# Patient Record
Sex: FEMALE | Race: WHITE | NOT HISPANIC OR LATINO | Employment: FULL TIME | URBAN - METROPOLITAN AREA
[De-identification: names, ages, dates, MRNs, and addresses within clinical notes are randomized per-mention and may not be internally consistent; named-entity substitution may affect disease eponyms.]

---

## 2024-10-14 ENCOUNTER — OFFICE VISIT (OUTPATIENT)
Dept: ENDOCRINOLOGY | Facility: CLINIC | Age: 34
End: 2024-10-14
Payer: COMMERCIAL

## 2024-10-14 VITALS
HEART RATE: 102 BPM | BODY MASS INDEX: 18.58 KG/M2 | DIASTOLIC BLOOD PRESSURE: 60 MMHG | WEIGHT: 101.63 LBS | SYSTOLIC BLOOD PRESSURE: 106 MMHG

## 2024-10-14 DIAGNOSIS — E03.9 HYPOTHYROIDISM, UNSPECIFIED TYPE: ICD-10-CM

## 2024-10-14 DIAGNOSIS — E06.3 HASHIMOTO'S THYROIDITIS: Primary | ICD-10-CM

## 2024-10-14 DIAGNOSIS — R53.83 OTHER FATIGUE: ICD-10-CM

## 2024-10-14 PROCEDURE — 3008F BODY MASS INDEX DOCD: CPT | Mod: CPTII,S$GLB,, | Performed by: HOSPITALIST

## 2024-10-14 PROCEDURE — 1160F RVW MEDS BY RX/DR IN RCRD: CPT | Mod: CPTII,S$GLB,, | Performed by: HOSPITALIST

## 2024-10-14 PROCEDURE — 3074F SYST BP LT 130 MM HG: CPT | Mod: CPTII,S$GLB,, | Performed by: HOSPITALIST

## 2024-10-14 PROCEDURE — 99999 PR PBB SHADOW E&M-EST. PATIENT-LVL III: CPT | Mod: PBBFAC,,, | Performed by: HOSPITALIST

## 2024-10-14 PROCEDURE — 3078F DIAST BP <80 MM HG: CPT | Mod: CPTII,S$GLB,, | Performed by: HOSPITALIST

## 2024-10-14 PROCEDURE — 99204 OFFICE O/P NEW MOD 45 MIN: CPT | Mod: S$GLB,,, | Performed by: HOSPITALIST

## 2024-10-14 PROCEDURE — 1159F MED LIST DOCD IN RCRD: CPT | Mod: CPTII,S$GLB,, | Performed by: HOSPITALIST

## 2024-10-14 RX ORDER — LEVOTHYROXINE SODIUM 50 UG/1
50 TABLET ORAL
COMMUNITY
Start: 2024-10-04

## 2024-10-14 NOTE — PROGRESS NOTES
Subjective:      Patient ID: Kenisha Fitch is a 34 y.o. female presented to [facility] on 10/14/2024.  Chief complaint:  Hashimoto's Thyroiditis      History of Present illness: Kenisha Fitch is a 34 y.o. female here for  Hashimoto's thyroiditis  No other significant past medical history:   Current PCP León Beckman MD    Interval history: Patient is here for Hashimoto's thyroiditis.  New to endocrinology.  Outside lab work review:  TSH: 4.4 done on 8/28/2024.  Per patient she was started on Synthroid 50 mcg daily by her PCP  Repeat lab work done on 9/4/2024: TSH of 2.79, free T4 1.0, thyroid peroxidase antibody: 301 (H)  Repeat lab work done 10/2/2024: TSH of 1.91, free T4: 1.5, free T3: 3.4  She report fatigue, lightheaded, swallowing issue.  Along with heat intolerance  COVID in 6/2024  Ear infection recently      Hashimoto's thyroiditis/hypothyroidism   - Diagnosed  in: 8/28/2024, with fatigue  - Family history thyroid disorder: yes, both side of family; aunt: Hashimoto, Mother and her maternal grandmother: hypothyroidism. Dad side also has thyroid issue  - Family history of thyroid cancer: no  - Hx of Thyroid goiter: no  - Tobacco user: no  - Plan for pregnancy at this time: no, No recent pregnancy   - Tested negative for LUPUS in the past, reports intolerance to egg problem  - did have COVID 06/2024, prior to symptoms as above    - Current medication: Synthroid (brand name) 50 mcg daily  - Takes thyroid medication properly without food first thing in the morning, yes    Current symptoms:   No   Yes  [x]    []   Weight gain>> mild gain 5 lb  []    [x]   Fatigue  [x]    []   Constipation  []    [x]   Diarrhea>> 3 days  []    [x]   Heat Intolerance   []    [x]   Brittle nails/Hair loss   [x]    []   Mental fog  [x]    []   Cold intolerance  [x]    []   Recent severe illness  [x]    []   Neck swelling, pain, pressure  [x]    []   Hoarseness  [x]    []   Lower extremity edema  [x]    []   " Skin discoloration/vitiligo    Medications:  [x]    []   Lithium Use  [x]    []   Amiodarone use  [x]    []   NSAIDs/systemic steroid  [x]    []   Chemotherapy   []    []   Radiation Treatment  []    []   Estrogen therapy  [x]    []   Supplements:  That includes:  High dose iodine: Including "thyroid support", Kelp, Iodine drops, Sea sams, Bladder wrack  [x]    []   Other Supplements: Biotin, Ashwagandha, selenium    Thyroid lab work  Lab Results   Component Value Date    TSH 2.489 10/11/2018    FREET4 1.12 10/11/2018      Antibodies  No results found for: "THYROPEROXID", "THYROIDAB", "TSIMMGLBN", "THYROIDSTIMI", "THYROTROPINR", "THGABSCRN"      The patient's medications, allergies, past medical, surgical, social and family histories were reviewed and updated as appropriate.  Review of Systems: pertinent positives as per the above HPI, and otherwise negative.    Objective:   /60   Pulse 102   Wt 46.1 kg (101 lb 9.6 oz)   BMI 18.58 kg/m²   Body mass index is 18.58 kg/m².  Vital signs reviewed    Physical Exam  Vitals and nursing note reviewed.   Constitutional:       Appearance: Normal appearance. She is well-developed. She is not ill-appearing.   Neck:      Thyroid: No thyromegaly.      Comments: Mild thyroid gland enlargement noted  Pulmonary:      Effort: Pulmonary effort is normal. No respiratory distress.   Musculoskeletal:         General: Normal range of motion.      Cervical back: Normal range of motion.   Neurological:      General: No focal deficit present.      Mental Status: She is alert. Mental status is at baseline.   Psychiatric:         Mood and Affect: Mood normal.         Behavior: Behavior normal.       Labs reviewed:  See results in subjective  No results found for: "HGBA1C"  No results found for: "CHOL", "HDL", "LDLCALC", "TRIG", "CHOLHDL"  Lab Results   Component Value Date     04/15/2021    K 3.6 04/15/2021     04/15/2021    CO2 26 04/15/2021     (H) 04/15/2021    " BUN 7 04/15/2021    CREATININE 0.8 04/15/2021    CALCIUM 9.6 04/15/2021    PROT 7.6 04/15/2021    ALBUMIN 4.6 04/15/2021    BILITOT 0.4 04/15/2021    ALKPHOS 50 (L) 04/15/2021    AST 17 04/15/2021    ALT 19 04/15/2021    ANIONGAP 9 04/15/2021    EGFRNORACEVR >105 09/18/2020    TSH 2.489 10/11/2018     Assessment     1. Hashimoto's thyroiditis        2. Hypothyroidism, unspecified type  TSH    T4, Free    T3    US Thyroid    Thyroid Peroxidase Antibody    COMPREHENSIVE METABOLIC PANEL      3. Other fatigue  COMPREHENSIVE METABOLIC PANEL    HEMOGLOBIN A1C    CBC Without Differential    Sedimentation rate    C-reactive protein    TATUM    Rheumatoid factor    Cyclic citrul peptide antibody, IgG    Sjogrens syndrome-A extractable nuclear antibody         Plan     No problem-specific Assessment & Plan notes found for this encounter.    Hashimoto's thyroiditis  - Patient has a positive TPO antibody,   - At this time, currently on Synthroid as below, TSH goal of less than 2.5 is recommended.  - Recommended a gluten-free or gluten-avoidance diet due to its potential association with autoimmune conditions of the small intestine.  - Provided an anti-inflammatory diet handout to support further dietary changes for long-term thyroid and immune health.  - Recommended over-the-counter Vitamin D3 supplements for immune support.  - Thyroid ultrasound order to evaluate enlargement    Hypothyroidism, unspecified type  - Hashimoto's thyroiditis, reportedly started on levothyroxine by PCP 08/2024  - patient reporting mixed symptoms of both hyperthyroidism and hypothyroidism at this time  - check TSH/T4/T3 in 4 weeks, may need dose adjustment      Other fatigue  - patient reports chronic fatigue with some mild weight loss.   - anemia screening, inflammatory screen  - check thyroid function as above  - normal vitamin-D level done by PCP      Advised patient to follow up with PCP for routine health maintenance care.   RTC in pending workup,  if normal yearly, if abnormal follow up with endocrine regularly 4-6 months    Brendan Hernandez M.D.  Endocrinology  [facility]  10/14/2024      Disclaimer: This note has been generated in part with the use of voice-recognition software. There may be typographical errors that have been missed during proof-reading.

## 2024-10-15 ENCOUNTER — HOSPITAL ENCOUNTER (OUTPATIENT)
Dept: RADIOLOGY | Facility: HOSPITAL | Age: 34
Discharge: HOME | End: 2024-10-15
Attending: HOSPITALIST
Payer: COMMERCIAL

## 2024-10-15 DIAGNOSIS — E03.9 HYPOTHYROIDISM, UNSPECIFIED TYPE: ICD-10-CM

## 2024-10-15 PROCEDURE — 76536 US EXAM OF HEAD AND NECK: CPT | Mod: TC

## 2024-10-15 PROCEDURE — 76536 US EXAM OF HEAD AND NECK: CPT | Mod: 26,,, | Performed by: STUDENT IN AN ORGANIZED HEALTH CARE EDUCATION/TRAINING PROGRAM

## 2024-11-11 ENCOUNTER — LAB VISIT (OUTPATIENT)
Dept: LAB | Facility: HOSPITAL | Age: 34
End: 2024-11-11
Attending: HOSPITALIST
Payer: COMMERCIAL

## 2024-11-11 DIAGNOSIS — E03.9 HYPOTHYROIDISM, UNSPECIFIED TYPE: ICD-10-CM

## 2024-11-11 DIAGNOSIS — R53.83 OTHER FATIGUE: ICD-10-CM

## 2024-11-11 LAB
ALBUMIN SERPL BCP-MCNC: 5 G/DL (ref 3.5–5.2)
ALP SERPL-CCNC: 48 U/L (ref 40–150)
ALT SERPL W/O P-5'-P-CCNC: 12 U/L (ref 10–44)
ANION GAP SERPL CALC-SCNC: 14 MMOL/L (ref 8–16)
AST SERPL-CCNC: 23 U/L (ref 10–40)
BILIRUB SERPL-MCNC: 0.5 MG/DL (ref 0.1–1)
BUN SERPL-MCNC: 8 MG/DL (ref 6–20)
CALCIUM SERPL-MCNC: 10.2 MG/DL (ref 8.7–10.5)
CHLORIDE SERPL-SCNC: 104 MMOL/L (ref 95–110)
CO2 SERPL-SCNC: 22 MMOL/L (ref 23–29)
CREAT SERPL-MCNC: 0.8 MG/DL (ref 0.5–1.4)
CRP SERPL-MCNC: 0.8 MG/L (ref 0–8.2)
EST. GFR  (NO RACE VARIABLE): >60 ML/MIN/1.73 M^2
GLUCOSE SERPL-MCNC: 95 MG/DL (ref 70–110)
POTASSIUM SERPL-SCNC: 5.4 MMOL/L (ref 3.5–5.1)
PROT SERPL-MCNC: 8.8 G/DL (ref 6–8.4)
RHEUMATOID FACT SERPL-ACNC: <13 IU/ML (ref 0–15)
SODIUM SERPL-SCNC: 140 MMOL/L (ref 136–145)
T3 SERPL-MCNC: 100 NG/DL (ref 60–180)
T4 FREE SERPL-MCNC: 1.31 NG/DL (ref 0.71–1.51)
TSH SERPL DL<=0.005 MIU/L-ACNC: 0.67 UIU/ML (ref 0.4–4)

## 2024-11-11 PROCEDURE — 84439 ASSAY OF FREE THYROXINE: CPT | Performed by: HOSPITALIST

## 2024-11-11 PROCEDURE — 84443 ASSAY THYROID STIM HORMONE: CPT | Performed by: HOSPITALIST

## 2024-11-11 PROCEDURE — 86038 ANTINUCLEAR ANTIBODIES: CPT | Performed by: HOSPITALIST

## 2024-11-11 PROCEDURE — 36415 COLL VENOUS BLD VENIPUNCTURE: CPT | Performed by: HOSPITALIST

## 2024-11-11 PROCEDURE — 86431 RHEUMATOID FACTOR QUANT: CPT | Performed by: HOSPITALIST

## 2024-11-11 PROCEDURE — 86140 C-REACTIVE PROTEIN: CPT | Performed by: HOSPITALIST

## 2024-11-11 PROCEDURE — 86235 NUCLEAR ANTIGEN ANTIBODY: CPT | Performed by: HOSPITALIST

## 2024-11-11 PROCEDURE — 80053 COMPREHEN METABOLIC PANEL: CPT | Performed by: HOSPITALIST

## 2024-11-11 PROCEDURE — 84480 ASSAY TRIIODOTHYRONINE (T3): CPT | Performed by: HOSPITALIST

## 2024-11-12 LAB — ANA SER QL IF: NORMAL

## 2024-11-13 LAB
ANTI-SSA ANTIBODY: 0.13 RATIO (ref 0–0.99)
ANTI-SSA INTERPRETATION: NEGATIVE

## 2024-11-14 ENCOUNTER — TELEPHONE (OUTPATIENT)
Dept: ENDOCRINOLOGY | Facility: CLINIC | Age: 34
End: 2024-11-14
Payer: COMMERCIAL

## 2024-11-14 DIAGNOSIS — E03.9 HYPOTHYROIDISM, UNSPECIFIED TYPE: Primary | ICD-10-CM

## 2024-11-14 RX ORDER — LEVOTHYROXINE SODIUM 25 UG/1
25 TABLET ORAL
Qty: 90 TABLET | Refills: 1 | Status: SHIPPED | OUTPATIENT
Start: 2024-11-14 | End: 2025-11-14

## 2024-11-15 ENCOUNTER — TELEPHONE (OUTPATIENT)
Dept: ENDOCRINOLOGY | Facility: CLINIC | Age: 34
End: 2024-11-15
Payer: COMMERCIAL

## 2024-11-15 NOTE — TELEPHONE ENCOUNTER
----- Message from Med Assistant Amada sent at 11/15/2024  9:37 AM CST -----  Type: Patient Call Back    Who called: Self    What is the request in detail: pt. Needs to be scheduled for labs and a appt. In 6 months with this provider..     Can the clinic reply by [facility]?NO    Would the patient rather a call back or a response via [facility]? Yes, call     Best call back number: [phone] (home)

## 2024-11-18 ENCOUNTER — LAB VISIT (OUTPATIENT)
Dept: LAB | Facility: HOSPITAL | Age: 34
End: 2024-11-18
Attending: HOSPITALIST
Payer: COMMERCIAL

## 2024-11-18 DIAGNOSIS — E03.9 HYPOTHYROIDISM, UNSPECIFIED: ICD-10-CM

## 2024-11-18 LAB
CCP AB SER IA-ACNC: <0.5 U/ML
ERYTHROCYTE [DISTWIDTH] IN BLOOD BY AUTOMATED COUNT: 11.7 % (ref 11.5–14.5)
ERYTHROCYTE [SEDIMENTATION RATE] IN BLOOD BY PHOTOMETRIC METHOD: 3 MM/HR (ref 0–36)
ESTIMATED AVG GLUCOSE: 103 MG/DL (ref 68–131)
HBA1C MFR BLD: 5.2 % (ref 4–5.6)
HCT VFR BLD AUTO: 41.6 % (ref 37–48.5)
HGB BLD-MCNC: 14.1 G/DL (ref 12–16)
MCH RBC QN AUTO: 29.7 PG (ref 27–31)
MCHC RBC AUTO-ENTMCNC: 33.9 G/DL (ref 32–36)
MCV RBC AUTO: 88 FL (ref 82–98)
PLATELET # BLD AUTO: 275 K/UL (ref 150–450)
PMV BLD AUTO: 9.7 FL (ref 9.2–12.9)
RBC # BLD AUTO: 4.75 M/UL (ref 4–5.4)
THYROPEROXIDASE IGG SERPL-ACNC: 351.9 IU/ML
WBC # BLD AUTO: 4.89 K/UL (ref 3.9–12.7)

## 2024-11-18 PROCEDURE — 85652 RBC SED RATE AUTOMATED: CPT | Performed by: HOSPITALIST

## 2024-11-18 PROCEDURE — 86376 MICROSOMAL ANTIBODY EACH: CPT | Performed by: HOSPITALIST

## 2024-11-18 PROCEDURE — 83036 HEMOGLOBIN GLYCOSYLATED A1C: CPT | Performed by: HOSPITALIST

## 2024-11-18 PROCEDURE — 86200 CCP ANTIBODY: CPT | Performed by: HOSPITALIST

## 2024-11-18 PROCEDURE — 85027 COMPLETE CBC AUTOMATED: CPT | Performed by: HOSPITALIST

## 2024-11-18 PROCEDURE — 36415 COLL VENOUS BLD VENIPUNCTURE: CPT | Performed by: HOSPITALIST

## 2025-03-07 ENCOUNTER — PATIENT MESSAGE (OUTPATIENT)
Dept: ENDOCRINOLOGY | Facility: CLINIC | Age: 35
End: 2025-03-07
Payer: COMMERCIAL

## 2025-03-07 ENCOUNTER — TELEPHONE (OUTPATIENT)
Dept: ENDOCRINOLOGY | Facility: CLINIC | Age: 35
End: 2025-03-07
Payer: COMMERCIAL

## 2025-03-07 NOTE — TELEPHONE ENCOUNTER
----- Message from Gabriele sent at 3/7/2025 11:19 AM CST -----  Regarding: self  Type: Patient Call BackWho called:selfWhat is the request in detail:Pt states that primary took more blood and her medications need to be adjusted. Can the clinic reply by [facility]? NoWould the patient rather a call back or a response via [facility]? Call [facility] call back number:[phone] Information:Thank you.

## 2025-03-12 ENCOUNTER — PATIENT MESSAGE (OUTPATIENT)
Dept: ENDOCRINOLOGY | Facility: CLINIC | Age: 35
End: 2025-03-12
Payer: COMMERCIAL

## 2025-04-28 ENCOUNTER — PATIENT MESSAGE (OUTPATIENT)
Dept: ENDOCRINOLOGY | Facility: CLINIC | Age: 35
End: 2025-04-28
Payer: COMMERCIAL

## 2025-04-28 DIAGNOSIS — E03.9 HYPOTHYROIDISM, UNSPECIFIED TYPE: ICD-10-CM

## 2025-04-28 RX ORDER — LEVOTHYROXINE SODIUM 25 UG/1
37.5 TABLET ORAL
Qty: 135 TABLET | Refills: 2 | Status: SHIPPED | OUTPATIENT
Start: 2025-04-28 | End: 2025-04-30 | Stop reason: SDUPTHER

## 2025-04-30 ENCOUNTER — TELEPHONE (OUTPATIENT)
Dept: ENDOCRINOLOGY | Facility: CLINIC | Age: 35
End: 2025-04-30
Payer: COMMERCIAL

## 2025-04-30 DIAGNOSIS — E03.9 HYPOTHYROIDISM, UNSPECIFIED TYPE: ICD-10-CM

## 2025-04-30 RX ORDER — LEVOTHYROXINE SODIUM 25 UG/1
37.5 TABLET ORAL
Qty: 135 TABLET | Refills: 2 | Status: SHIPPED | OUTPATIENT
Start: 2025-04-30

## 2025-04-30 NOTE — TELEPHONE ENCOUNTER
----- Message from Pema sent at 4/30/2025 10:10 AM CDT -----  Type:  Pharmacy Calling to Clarify an RXName of Caller: [place] [facility] Name: SYNTHROID 25 mcg tabletWhat do they need to clarify?  Would like to know if medication can be changed to generic brandCan you be contacted via [facility]? No Best Call Back Number: [phone] Information:

## 2025-04-30 NOTE — TELEPHONE ENCOUNTER
Pt current pharmacy supplier is no longer getting synthroid and would have to be switched to generic. Dr Hernandez gave pt a option to try [facility] to see if they have brand name before we send generic to [facility]. Pt verbalized understanding.

## 2025-05-02 ENCOUNTER — TELEPHONE (OUTPATIENT)
Dept: ENDOCRINOLOGY | Facility: CLINIC | Age: 35
End: 2025-05-02
Payer: COMMERCIAL

## 2025-05-02 NOTE — TELEPHONE ENCOUNTER
Called pt back regarding medication. Pt stated she had actually found the pharmacy number after she left us a message. Pt called them and everything was taken care of. Understanding verbalized.

## 2025-05-02 NOTE — TELEPHONE ENCOUNTER
----- Message from Sabina sent at 5/2/2025  2:59 PM CDT -----  .Type: Patient Call BackWho called: Self What is the request in detail: Calling about SYNTHROID 25 mcg tablet. Ask that the nurse give her a call Can the clinic reply by [facility]? No Would the patient rather a call back or a response via [facility]? Call Back Best call back number: .[phone] ([place]) Additional Information:

## 2025-05-10 ENCOUNTER — LAB VISIT (OUTPATIENT)
Dept: LAB | Facility: HOSPITAL | Age: 35
End: 2025-05-10
Attending: HOSPITALIST
Payer: COMMERCIAL

## 2025-05-10 DIAGNOSIS — E03.9 HYPOTHYROIDISM, UNSPECIFIED TYPE: ICD-10-CM

## 2025-05-10 LAB
T4 FREE SERPL-MCNC: 1.15 NG/DL (ref 0.71–1.51)
T4 FREE SERPL-MCNC: 1.15 NG/DL (ref 0.71–1.51)
THYROPEROXIDASE IGG SERPL-ACNC: 438.6 IU/ML
TSH SERPL-ACNC: 2.01 UIU/ML (ref 0.4–4)

## 2025-05-10 PROCEDURE — 36415 COLL VENOUS BLD VENIPUNCTURE: CPT

## 2025-05-10 PROCEDURE — 86376 MICROSOMAL ANTIBODY EACH: CPT

## 2025-05-10 PROCEDURE — 84443 ASSAY THYROID STIM HORMONE: CPT

## 2025-05-14 ENCOUNTER — OFFICE VISIT (OUTPATIENT)
Dept: ENDOCRINOLOGY | Facility: CLINIC | Age: 35
End: 2025-05-14
Payer: COMMERCIAL

## 2025-05-14 VITALS
HEART RATE: 91 BPM | BODY MASS INDEX: 18.14 KG/M2 | DIASTOLIC BLOOD PRESSURE: 78 MMHG | WEIGHT: 99.19 LBS | SYSTOLIC BLOOD PRESSURE: 112 MMHG

## 2025-05-14 DIAGNOSIS — K90.49 FOOD INTOLERANCE: ICD-10-CM

## 2025-05-14 DIAGNOSIS — E06.3 HYPOTHYROIDISM DUE TO HASHIMOTO THYROIDITIS: Primary | ICD-10-CM

## 2025-05-14 DIAGNOSIS — E06.3 HASHIMOTO THYROIDITIS: ICD-10-CM

## 2025-05-14 DIAGNOSIS — E55.9 VITAMIN D DEFICIENCY: ICD-10-CM

## 2025-05-14 PROCEDURE — G2211 COMPLEX E/M VISIT ADD ON: HCPCS | Mod: S$GLB,,, | Performed by: HOSPITALIST

## 2025-05-14 PROCEDURE — 1159F MED LIST DOCD IN RCRD: CPT | Mod: CPTII,S$GLB,, | Performed by: HOSPITALIST

## 2025-05-14 PROCEDURE — 3008F BODY MASS INDEX DOCD: CPT | Mod: CPTII,S$GLB,, | Performed by: HOSPITALIST

## 2025-05-14 PROCEDURE — 1160F RVW MEDS BY RX/DR IN RCRD: CPT | Mod: CPTII,S$GLB,, | Performed by: HOSPITALIST

## 2025-05-14 PROCEDURE — 99999 PR PBB SHADOW E&M-EST. PATIENT-LVL III: CPT | Mod: PBBFAC,,, | Performed by: HOSPITALIST

## 2025-05-14 PROCEDURE — 3074F SYST BP LT 130 MM HG: CPT | Mod: CPTII,S$GLB,, | Performed by: HOSPITALIST

## 2025-05-14 PROCEDURE — 99214 OFFICE O/P EST MOD 30 MIN: CPT | Mod: S$GLB,,, | Performed by: HOSPITALIST

## 2025-05-14 PROCEDURE — 3078F DIAST BP <80 MM HG: CPT | Mod: CPTII,S$GLB,, | Performed by: HOSPITALIST

## 2025-05-14 RX ORDER — LIOTHYRONINE SODIUM 5 UG/1
5 TABLET ORAL DAILY
Qty: 90 TABLET | Refills: 1 | Status: SHIPPED | OUTPATIENT
Start: 2025-05-14 | End: 2026-05-14

## 2025-05-14 NOTE — PROGRESS NOTES
Subjective:      Patient ID: Kenisha Fitch is a 35 y.o. female presented to [facility] on 5/14/2025.  Chief complaint:  Hashimoto's Thyroiditis      History of Present illness: Kenisha Fitch is a 35 y.o. female here for  Hashimoto's thyroiditis and hypothyroidism  No other significant past medical history:   Current PCP León Beckman MD    Interval history: Patient is here follow up for Hashimoto's thyroiditis hypothyroidism  Currently on name brand Synthroid 31.25 mcg daily (1 and 1/4 pill) daily   Patient reports that she is on a gluten free diet, does feel better.    Still reports multiple food intolerance, requesting evaluation  Still with some fatigue, patient quite sensitive to levothyroxine dose, with symptoms hyperthyroidism when on higher doses: 50 mcg  Weight is stable now  Current in clinic weight, 99.2 lb, previous in clinic weight 101      Hashimoto's thyroiditis/hypothyroidism   - Diagnosed  in: 8/28/2024, with fatigue  - Family history thyroid disorder: yes, both side of family; aunt: Hashimoto, Mother and her maternal grandmother: hypothyroidism. Dad side also has thyroid issue  - Did have COVID 06/2024, prior to symptoms  - Outside lab work review:  Elevated TSH: 4.4 done on 8/28/2024.  Per patient she was started on Synthroid 50 mcg daily by her PCP  - Repeat lab work done on 9/4/2024: TSH of 2.79, free T4 1.0, thyroid peroxidase antibody: 301 (H)   - Repeat lab work done 10/2/2024: TSH of 1.91, free T4: 1.5, free T3: 3.4  - Family history of thyroid cancer: no  - Hx of Thyroid goiter: no  - Tobacco user: no  - Plan for pregnancy at this time: no, No recent pregnancy   - Tested negative for LUPUS in the past, reports intolerance to egg (allergy)  - Had issue in getting her Synthroid 37.5 mcg daily at her pharmacy ([facility])>> heart racing, dose was decreased by her 31.25 mcg on 3/2025  - Still with some fatigue 2025 but better, patient quite sensitive to levothyroxine dose,  "with symptoms hyperthyroidism when on higher doses: 50 mcg  - Weight is stable now    CURRENT MEDICATION: Synthroid 31.25 mcg daily (1 and 1/4 pill) nightly  - Takes thyroid medication properly, waiting 4 hours after eating prior taking her Synthroid    Current symptoms:   No   Yes  [x]    []   Weight gain>> stable  []    [x]   Fatigue  [x]    []   Constipation  []    [x]   Diarrhea>> 3 days  []    [x]   Heat Intolerance   []    [x]   Brittle nails/Hair loss   [x]    []   Mental fog  [x]    []   Cold intolerance  [x]    []   Recent severe illness  [x]    []   Neck swelling, pain, pressure  [x]    []   Hoarseness  [x]    []   Lower extremity edema  [x]    []   Skin discoloration/vitiligo    Medications:  [x]    []   Lithium Use  [x]    []   Amiodarone use  [x]    []   NSAIDs/systemic steroid  [x]    []   Chemotherapy   []    []   Radiation Treatment  []    []   Estrogen therapy  [x]    []   Supplements:  That includes:  High dose iodine: Including "thyroid support", Kelp, Iodine drops, Sea sams, Bladder wrack  [x]    []   Other Supplements: Biotin, Ashwagandha, selenium    Thyroid lab work  Lab Results   Component Value Date    TSH 2.006 05/10/2025    TSH 0.670 11/11/2024    TSH 2.489 10/11/2018    FREET4 1.15 05/10/2025    FREET4 1.15 05/10/2025    FREET4 1.31 11/11/2024    T6UKFYD 100 11/11/2024      Antibodies  Lab Results   Component Value Date    THYROPEROXID 438.6 (H) 05/10/2025    THYROPEROXID 351.9 (H) 11/18/2024       Evaluation of Fatigue  - hormonal evaluation done 11/2024: Negative    Weight trend  Wt Readings from Last 3 Encounters:   05/14/25 1604 45 kg (99 lb 3.2 oz)   10/14/24 1408 46.1 kg (101 lb 9.6 oz)   04/15/21 1255 43.1 kg (95 lb)      Latest Reference Range & Units 11/11/24 09:00 11/18/24 09:10 05/10/25 09:17   Hemoglobin 12.0 - 16.0 g/dL  14.1    Hematocrit 37.0 - 48.5 %  41.6    Sed Rate 0 - 36 mm/Hr  3    CRP 0.0 - 8.2 mg/L 0.8     TSH 0.400 - 4.000 uIU/mL 0.670  2.006   Free T4 0.71 - 1.51 " "ng/dL  0.71 - 1.51 ng/dL 1.31  1.15  1.15   TATUM Screen Negative <1:80  Negative <1:80     Anti-SSA Antibody 0.00 - 0.99 Ratio 0.13     Anti-SSA Interpretation Negative  Negative     CCP Antibodies <5.0 U/mL  <0.5    Rheumatoid Factor 0.0 - 15.0 IU/mL <13.0         The patient's medications, allergies, past medical, surgical, social and family histories were reviewed and updated as appropriate.  Review of Systems: pertinent positives as per the above HPI, and otherwise negative.    Objective:   /78   Pulse 91   Wt 45 kg (99 lb 3.2 oz)   BMI 18.14 kg/m²   Body mass index is 18.14 kg/m².  Vital signs reviewed    Physical Exam  Vitals and nursing note reviewed.   Constitutional:       Appearance: Normal appearance. She is well-developed. She is not ill-appearing.   Neck:      Thyroid: No thyromegaly.      Comments: Mild thyroid gland enlargement noted  Pulmonary:      Effort: Pulmonary effort is normal. No respiratory distress.   Musculoskeletal:         General: Normal range of motion.      Cervical back: Normal range of motion.   Neurological:      General: No focal deficit present.      Mental Status: She is alert. Mental status is at baseline.   Psychiatric:         Mood and Affect: Mood normal.         Behavior: Behavior normal.       Labs reviewed:  See results in subjective  Lab Results   Component Value Date    HGBA1C 5.2 11/18/2024     No results found for: "CHOL", "HDL", "LDLCALC", "TRIG", "CHOLHDL"  Lab Results   Component Value Date     11/11/2024    K 5.4 (H) 11/11/2024     11/11/2024    CO2 22 (L) 11/11/2024    GLU 95 11/11/2024    BUN 8 11/11/2024    CREATININE 0.8 11/11/2024    CALCIUM 10.2 11/11/2024    PROT 8.8 (H) 11/11/2024    ALBUMIN 5.0 11/11/2024    BILITOT 0.5 11/11/2024    ALKPHOS 48 11/11/2024    AST 23 11/11/2024    ALT 12 11/11/2024    ANIONGAP 14 11/11/2024    EGFRNORACEVR >60 11/11/2024    TSH 2.006 05/10/2025     Assessment     1. Hypothyroidism due to Hashimoto " thyroiditis  liothyronine (CYTOMEL) 5 MCG Tab    TSH    T4, Free    T3      2. Food intolerance  Ambulatory referral/consult to Allergy      3. Hashimoto thyroiditis        4. Vitamin D deficiency          Plan     Hypothyroidism due to Hashimoto thyroiditis  - Hypothyroidism due to autoimmune thyroiditis  - I reviewed lab work trends: TSH, Free T4, with patient in clinic today 5/14/2025   - Discussed and confirmed the proper way of taking Synthroid:  Currently taking nightly on an empty stomach  - CONTINUE Synthroid 25 mcg mcg daily., Trun vial of low-dose Cytomel 5 mg in the morning to see if that helps with her clinical symptoms, okay for patient to decrease dose if needed  - Trend lab work TSH, FT4 every 6 months> follow-up with endocrinology to adjust medication  - If TSH/T4 in normal range, patient should continue refills with their PCP León Beckman MD, given chronic nature of hypothyroidism    - Advised patient to contact me if she would to get pregnant for thyroid dose adjustment    Hashimoto thyroiditis  - Patient has a positive TPO antibody. At this time, on levothyroxine due to TSH elevation in the past, along with symptoms as above  - At this time, currently on Synthroid, TSH goal of less than <2.5 is recommended.  - Currently on gluten free diet, which has helped some of her symptoms, still with ongoing GI symptoms, WILL HAVE PATIENT SEE [facility]  - Continue vitamin D3, increase vitamin D3 to 2000 IU daily  - Consider dairy free diet    Vitamin D deficiency  - Lab work reviewed, and discussed with patient in clinic  - Vitamin-D goal greater than >30  - Currently OTC VitD3 2000iu daily  - lab work every 6 mo or yearly     Food allergy  - allergy referral      Advised patient to follow up with PCP for routine health maintenance care.   RTC in every 6 months    Visit today included increased complexity associated with the care of the episodic problem addressed and managing the  longitudinal care of the patient due to the serious and/or complex managed problem(s).   Including:  Hypothyroidism, Hashimoto's thyroiditis, vitamin-D    Brendan Hernandez M.D.  Endocrinology  [facility]  5/14/2025      Disclaimer: This note has been generated in part with the use of voice-recognition software. There may be typographical errors that have been missed during proof-reading.

## 2025-05-14 NOTE — ASSESSMENT & PLAN NOTE
- Lab work reviewed, and discussed with patient in clinic  - Vitamin-D goal greater than >30  - Currently OTC VitD3 2000iu daily  - lab work every 6 mo or yearly

## 2025-05-14 NOTE — ASSESSMENT & PLAN NOTE
- Patient has a positive TPO antibody. At this time, on levothyroxine due to TSH elevation in the past, along with symptoms as above  - At this time, currently on Synthroid, TSH goal of less than <2.5 is recommended.  - Currently on gluten free diet, which has helped some of her symptoms, still with ongoing GI symptoms, WILL HAVE PATIENT SEE [facility]  - Continue vitamin D3, increase vitamin D3 to 2000 IU daily  - Consider dairy free diet

## 2025-05-14 NOTE — ASSESSMENT & PLAN NOTE
- Hypothyroidism due to autoimmune thyroiditis  - I reviewed lab work trends: TSH, Free T4, with patient in clinic today 5/14/2025   - Discussed and confirmed the proper way of taking Synthroid:  Currently taking nightly on an empty stomach  - CONTINUE Synthroid 25 mcg mcg daily., Trun vial of low-dose Cytomel 5 mg in the morning to see if that helps with her clinical symptoms, okay for patient to decrease dose if needed  - Trend lab work TSH, FT4 every 6 months> follow-up with endocrinology to adjust medication  - If TSH/T4 in normal range, patient should continue refills with their PCP León Beckman MD, given chronic nature of hypothyroidism    - Advised patient to contact me if she would to get pregnant for thyroid dose adjustment

## 2025-07-18 ENCOUNTER — OFFICE VISIT (OUTPATIENT)
Facility: CLINIC | Age: 35
End: 2025-07-18
Payer: COMMERCIAL

## 2025-07-18 ENCOUNTER — LAB VISIT (OUTPATIENT)
Dept: LAB | Facility: HOSPITAL | Age: 35
End: 2025-07-18
Attending: STUDENT IN AN ORGANIZED HEALTH CARE EDUCATION/TRAINING PROGRAM
Payer: COMMERCIAL

## 2025-07-18 VITALS — BODY MASS INDEX: 17.75 KG/M2 | HEIGHT: 62 IN | WEIGHT: 96.44 LBS

## 2025-07-18 DIAGNOSIS — R11.0 NAUSEA: ICD-10-CM

## 2025-07-18 DIAGNOSIS — J30.9 CHRONIC ALLERGIC RHINITIS: ICD-10-CM

## 2025-07-18 DIAGNOSIS — R41.89 BRAIN FOG: ICD-10-CM

## 2025-07-18 DIAGNOSIS — K90.49 FOOD INTOLERANCE: ICD-10-CM

## 2025-07-18 DIAGNOSIS — K90.49 FOOD INTOLERANCE: Primary | ICD-10-CM

## 2025-07-18 LAB
IGA SERPL-MCNC: 185 MG/DL (ref 40–350)
IGE SERPL-ACNC: 50 IU/ML

## 2025-07-18 PROCEDURE — 99999 PR PBB SHADOW E&M-EST. PATIENT-LVL II: CPT | Mod: PBBFAC,,, | Performed by: STUDENT IN AN ORGANIZED HEALTH CARE EDUCATION/TRAINING PROGRAM

## 2025-07-18 PROCEDURE — 86364 TISS TRNSGLTMNASE EA IG CLAS: CPT

## 2025-07-18 PROCEDURE — 86003 ALLG SPEC IGE CRUDE XTRC EA: CPT | Mod: 59

## 2025-07-18 PROCEDURE — 36415 COLL VENOUS BLD VENIPUNCTURE: CPT

## 2025-07-18 PROCEDURE — 83520 IMMUNOASSAY QUANT NOS NONAB: CPT

## 2025-07-18 PROCEDURE — 86003 ALLG SPEC IGE CRUDE XTRC EA: CPT

## 2025-07-18 PROCEDURE — 82785 ASSAY OF IGE: CPT

## 2025-07-18 PROCEDURE — 82784 ASSAY IGA/IGD/IGG/IGM EACH: CPT

## 2025-07-18 NOTE — PROGRESS NOTES
ALLERGY & IMMUNOLOGY CLINIC - INITIAL CONSULTATION      HISTORY OF PRESENT ILLNESS     Patient ID: Kenisha Fitch is a 35 y.o. female    CC: food intolerances     HPI: Kenisha Fitch is a 35 y.o. female with hashimoto's thyroiditis, presenting for food intolerances.   Patient was referred by Brendan Hernandez MD ([facility]).     She said all of the food intolerances seemed to start 8/2024, around time of hashimoto's diagnosis and getting covid. She wonders about possibility of long covid.     With egg, the last time she remembers eating them, she got a splitting migraine, nausea (can't remember if she actually vomited). It started within about 30 min of eating a plate of scrambled eggs. It lasted about 6 hours. She says she thinks she had similar problems to eggs prior, but not to that extent. She has been avoiding eggs since last summer.     She says she never had any reactions to gluten, but she now eats gluten-free. Overall, she has been slowly feeling better over time. Not sure if it is due to avoiding gluten.    She says some of her reactions seem to involve brain fog, to the point of confusion. Sometimes fatigue.   Last night it happened (she ate beans).  It happened recently after eating red beans.   Last time she ate soy, caused extreme fatigue for 3 days, couldn't get out of bed. This has happened more than once.   With peanuts, she gets extreme brain fog as well.  She says the brain fog starts quickly, 5-10 minutes after eating.      She also deals with rhinorrhea. Sometimes itchy eyes. She says she had skin testing with an ENT around 2020. She recalls testing positive to dust mite and cockroach, and maybe equivocal results to cats and grass pollen.   She hasn't been taking anything for it.   She used to take claritin prn.  She used to take flonase every day.  She did find these medications helpful.       MEDICAL HISTORY     Vaccines:   Immunization History   Administered Date(s) Administered    Tdap 10/08/2014  "    Medical Hx:   Problem List[1]    Surgical Hx:   Past Surgical History:   Procedure Laterality Date    NOSE SURGERY  2011    Rhinoplasty & Septoplasty For Fracture     Medications:   Medications Ordered Prior to Encounter[2]    H/o Asthma: denies  H/o Rhinitis: endorses    Drug Allergies: Review of patient's allergies indicates:  No Known Allergies    Env/Occ:   Pets: 2 dogs and 3 birds. The dogs don't seem to trigger symptoms. She doesn't know about the birds.   Occupation: works in an office setting    Social Hx:   Social History[3]    Family Hx:   Family History   Problem Relation Name Age of Onset    Hypertension Paternal Grandmother      Hypertension Brother Oswaldo 25    Breast cancer Neg Hx      Colon cancer Neg Hx      Diabetes Neg Hx      Ovarian cancer Neg Hx      Cancer Neg Hx      Miscarriages / Stillbirths Neg Hx      Eclampsia Neg Hx       labor Neg Hx      Stroke Neg Hx        PHYSICAL EXAM     VS: Ht 5' 2" (1.575 m)   Wt 43.8 kg (96 lb 7.2 oz)   BMI 17.64 kg/m²   GENERAL: Alert, NAD, well-appearing  EYES: EOMI, no conjunctival injection, no discharge, no infraorbital shiners  NOSE: NT 1-2+ B/L, no stringing mucus, no polyps visualized  ORAL: MMM, no ulcers, no thrush, no cobblestoning  LUNGS: CTAB, no w/r/c, no increased WOB  HEART: RRR, normal S1/S2, no m/g/r  DERM: no rashes     LABORATORY STUDIES     Component      Latest Ref Rng 5/10/2025   TSH      0.400 - 4.000 uIU/mL 2.006    Free T4      0.71 - 1.51 ng/dL 1.15    Thyroperoxidase Antibodies      <6.0 IU/mL 438.6 (H)      Component      Latest Ref Rng 4/15/2021 2024 2024   WBC      3.90 - 12.70 K/uL 6.61   4.89    RBC      4.00 - 5.40 M/uL 4.63   4.75    Hemoglobin      12.0 - 16.0 g/dL 13.8   14.1    Hematocrit      37.0 - 48.5 % 39.5   41.6    MCV      82 - 98 fL 85   88    MCH      27.0 - 31.0 pg 29.8   29.7    MCHC      32.0 - 36.0 g/dL 34.9   33.9    RDW      11.5 - 14.5 % 11.7   11.7    Platelet Count      150 - 450 " K/uL 263   275    MPV      9.2 - 12.9 fL 9.7   9.7    Immature Granulocytes      0.0 - 0.5 % 0.5      Gran # (ANC)      1.8 - 7.7 K/uL 5.0      Immature Grans (Abs)      0.00 - 0.04 K/uL 0.03      Lymph #      1.0 - 4.8 K/uL 1.2      Mono #      0.3 - 1.0 K/uL 0.3      Eos #      0.0 - 0.5 K/uL 0.1      Baso #      0.00 - 0.20 K/uL 0.03      Differential Method Automated      Sodium      136 - 145 mmol/L  140     Potassium      3.5 - 5.1 mmol/L  5.4 (H)     Chloride      95 - 110 mmol/L  104     CO2      23 - 29 mmol/L  22 (L)     Glucose      70 - 110 mg/dL  95     BUN      6 - 20 mg/dL  8     Creatinine      0.5 - 1.4 mg/dL  0.8     Calcium      8.7 - 10.5 mg/dL  10.2     PROTEIN TOTAL      6.0 - 8.4 g/dL  8.8 (H)     Albumin      3.5 - 5.2 g/dL  5.0     BILIRUBIN TOTAL      0.1 - 1.0 mg/dL  0.5     ALP      40 - 150 U/L  48     AST      10 - 40 U/L  23     ALT      10 - 44 U/L  12     eGFR      >60 mL/min/1.73 m^2  >60     Anion Gap      8 - 16 mmol/L  14     Hemoglobin A1C External      4.0 - 5.6 %   5.2      Component      Latest Ref Rng 11/11/2024 11/18/2024   Anti-SSA Antibody      0.00 - 0.99 Ratio 0.13     Anti-SSA Interpretation      Negative  Negative     CRP      0.0 - 8.2 mg/L 0.8     TATUM Screen      Negative <1:80  Negative <1:80     Rheumatoid Factor      0.0 - 15.0 IU/mL <13.0     Sed Rate      0 - 36 mm/Hr  3    CCP Antibodies      <5.0 U/mL  <0.5         ALLERGEN TESTING     Immunocaps: Ordered.      CHART REVIEW     Reviewed endocrine note, labs.      ASSESSMENT & PLAN     Kenisha Fitch is a 35 y.o. female with     # Food intolerances, brain fog: All started 8/2024, shortly after covid infection and hashimoto's diagnosis. Possibility of long covid. With egg, she reports nausea and migraine within 30 minutes. With other foods (peanuts, soy, beans), she reports immediate brain fog. No known symptoms with gluten, but she avoids out of caution.  -checking immunocaps for egg, peanuts, soy, and beans.  Explained that this testing is specifically evaluating for IgE-mediated allergy (which her symptoms aren't consistent with). Explained that a negative result would provide confirmation that this is not an IgE-mediated process; however, a positive result doesn't equate to clinical IgE-mediated allergy.  -screening for celiac disease with TTG IgA and total IgA.  -checking baseline tryptase.  -I noted that all the foods she has found to cause brain fog are legumes (patient didn't previously make this connection). While I don't know mechanism of her symptoms, can consider trial elimination of all legumes, and then later consider reintroduction of some.     # Chronic allergic rhinoconjunctivitis: She recalls allergy testing around 2020 positive to dust mite and cockroach. She isn't currently taking medications for it, but found loratadine and flonase helpful in the past.  -consider restarting loratadine and flonase depending on symptoms.       Follow up: 2-3 weeks to discuss results    I spent a total of 55 minutes on the day of the visit.  This includes face to face time and non-face to face time preparing to see the patient (eg, review of tests), obtaining and/or reviewing separately obtained history, documenting clinical information in the electronic or other health record.    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 90 tablet 1    SYNTHROID 25 mcg tablet Take 1.5 tablets (37.5 mcg total) by mouth before breakfast. 135 tablet 2     No current facility-administered medications on file prior to visit.   [3]   Social History  Tobacco Use    Smoking status: Never    Smokeless  tobacco: Never   Substance Use Topics    Alcohol use: Yes     Alcohol/week: 2.0 standard drinks of alcohol     Types: 2 Shots of liquor per week    Drug use: No

## 2025-07-21 LAB
Lab: <0.1 KU/L
Lab: NORMAL
TRYPTASE SERPL-MCNC: 2.2 NG/ML
W EGG WHITE, CLASS: NORMAL
W EGG WHITE, IGE: <0.1 KU/L
W PEANUT, CLASS: NORMAL
W PEANUT, IGE: <0.1 KU/L
W SOYBEAN, CLASS: NORMAL
W SOYBEAN, IGE: <0.1 KU/L
W TISSUE TRANSGLUTAMINASE IGA AB: 0.2 U/ML

## 2025-07-23 ENCOUNTER — PATIENT MESSAGE (OUTPATIENT)
Facility: CLINIC | Age: 35
End: 2025-07-23
Payer: COMMERCIAL

## 2025-08-13 ENCOUNTER — PATIENT MESSAGE (OUTPATIENT)
Dept: ENDOCRINOLOGY | Facility: CLINIC | Age: 35
End: 2025-08-13
Payer: COMMERCIAL